# Patient Record
Sex: FEMALE | Race: WHITE | NOT HISPANIC OR LATINO | Employment: FULL TIME | URBAN - METROPOLITAN AREA
[De-identification: names, ages, dates, MRNs, and addresses within clinical notes are randomized per-mention and may not be internally consistent; named-entity substitution may affect disease eponyms.]

---

## 2023-04-20 NOTE — PROGRESS NOTES
Daily Note     Today's date: 2023  Patient name: Josep Hull  : 1960  MRN: [id number]  Referring provider: Maria Dolores Bertrand DO  Dx:   Encounter Diagnosis     ICD-10-CM    1  Leg pain, left  M79 [id number]       2  Decreased strength of lower extremity  R29 [id number]       3  Ambulatory dysfunction  R26 2           Start Time: 1630  Stop Time: 1730  Total time in clinic (min): 60 minutes    Subjective: Pt reports she walked a quarter of a mile and a half a mile  She also did a short spin class since last visit  Overall is feeling well  Objective: See treatment diary below      Assessment: Josep Hull tolerated today's treatment session well  Patient education provided on progression of TE and gait  Ángela Yoon completed all TE with good form and no adverse reactions  She continues to have antalgic gait and decreased tolerance to WBing on LLE  However with increasing moni and focus on arm swing pt able to normalize gait pattern  She was able to complete floor to stand transfer with some increased time but independently  Ángela Yoon continues to benefit from skilled OPPT services to address left leg pain   Will continue to address functional deficits and focus on progression of POC per patient tolerance  Patient performed Nustep to increase blood flow to the area being treated, prepare the muscles for strength training and stretching, improve overall tolerance to activity, and aerobic endurance  Plan: Continue per plan of care  Progress treatment as tolerated  Precautions: standard, fall     Access Code: [email or web address]  URL: [email or web address]  Date: 2023  Prepared by: Julius Ayala 62  - 2 x daily - 1 sets - 10 reps - 10 second hold  - Hip abduction  - 1 x daily - 1 sets - 10 reps - 10 second  hold  - Clam  - 2 x daily - 1 sets - 20 reps  - Active Straight Leg Raise with Quad Set  - 2 x daily - 1 sets - 10-20 reps  - Sit to Stand   - 1 x "daily - 1 sets - 10 reps  - Bridge with Abduction and Resistance Loop  - 2 x daily - 1 sets - 15-20 reps  - Heel Raise  - 2 x daily - 1 sets - 20 reps  - Seated March  - 1 x daily - 1 sets - 20 reps    Date: 4/17/2023 4/19/2023 4/25/2023          Visit: #1 #2 #3 #4 #5 #6 #7 #8 #9 #10   Manual:             SIJ (+) L posterior rotation corrected with MET                         Neuro Re-Ed                                                                                                        Ther Ex             Warm Up  NuStep level 3 BUE/BLE  NuStep level 4 BUE/BLE 10 min           Seated hip Add 10\" hold x5  10\" hold x10            Seated hip abd  10\" hold x5 Yellow COB 10\" hold x10            Lateral walk with TB   30' 2 laps           Standing hip abd/ext   Max tactile and verbal cues x10 each  Flexion/abd/ext x10 each LE           Sit to stand   With Yellow CO band x10 (cues for form) With CO red x15           SLR    Abduction x15 each LE          Clamshells   x20 each side            Standing HR  x20  Double leg x20          Bridge with hip abd   Yellow CO band x20  CO red x20           Seated alternating marches   No UE support x20 each LE            TRX Squat    2x10           Step up   Forward 6\" x10 each LE          Bridge on peanut ball    x10           Bridge on peanut ball with HS curl    x10           Leg press    DL: 75# x15    SL: 55# x10 each LE                        Ther Activity             Discussed STS with no UEs to increase strength in LEs   [address] Reviewed and is implementing            Encouraged pt to decrease use of UEs to help lift/move LLE to improve strength   KH Reviewed and is implementing                                     Gait Training             Focus on decreasing compensatory movement patters   Heel toe, decreased lateral trunk lean  Walking laps around office 3 laps - focus in moni and = step length                         Modalities                                     "

## 2023-04-25 ENCOUNTER — OFFICE VISIT (OUTPATIENT)
Age: 63
End: 2023-04-25

## 2023-04-25 DIAGNOSIS — R26.2 AMBULATORY DYSFUNCTION: ICD-10-CM

## 2023-04-25 DIAGNOSIS — M79.605 LEG PAIN, LEFT: Primary | ICD-10-CM

## 2023-04-25 DIAGNOSIS — R29.898 DECREASED STRENGTH OF LOWER EXTREMITY: ICD-10-CM

## 2023-04-26 NOTE — PROGRESS NOTES
Discharge    Patient discharged on 4/26/2023  Pt called and requested we cancel all future appointments  She found a therapy office that is in network with insurance

## 2023-04-27 ENCOUNTER — APPOINTMENT (OUTPATIENT)
Age: 63
End: 2023-04-27